# Patient Record
(demographics unavailable — no encounter records)

---

## 2017-06-26 NOTE — ED PDOC
Arrival/HPI





- General


Historian: Patient





- History of Present Illness


Time/Duration: > week


Symptom Course: Intermittent


Context: Home





- General


Chief Complaint: Abdominal Pain


Time Seen by Provider: 06/26/17 17:37





- History of Present Illness


Narrative History of Present Illness (Text): 





06/26/17 18:32


21 yo female with PMH of asthma presented to emergency department with 

abdominal pain. Patient states the pain started 2 weeks ago. She states that 

its a constant pain, located on her lower left quadrant. It is temporarily 

improved with ibuprofen medication. She states the pain is worse when she is 

laying on her left side. She also reports nasal congestion and sore throat for 

which she recently received a prescription for antibiotics. She also report 

increase urinary frequency, denies dysuria, chest pain. 


PMD:   





 (Vijaya Zhou)





Past Medical History





- Provider Review


Nursing Documentation Reviewed: Yes





- Infectious Disease


Hx of Infectious Diseases: None





- Tetanus Immunization


Tetanus Immunization: Unknown





- Cardiac


Hx Cardiac Disorders: No





- Pulmonary


Hx Respiratory Disorders: Yes


Hx Asthma: Yes


Hx Bronchitis: Yes





- Neurological


Hx Neurological Disorder: No





- HEENT


Hx HEENT Disorder: No





- Renal


Hx Renal Disorder: No





- Endocrine/Metabolic


Hx Endocrine Disorders: No





- Hematological/Oncological


Hx Blood Disorders: No





- Integumentary


Hx Dermatological Disorder: No





- Musculoskeletal/Rheumatological


Hx Musculoskeletal Disorders: No





- Gastrointestinal


Hx Gastrointestinal Disorders: No





- Genitourinary/Gynecological


Hx Genitourinary Disorders: Yes


Other/Comment: bladder infection





- Psychiatric


Hx Psychophysiologic Disorder: No


Hx Substance Use: No





- Surgical History


Other/Comment: WISDOM TOOTH EXRACTION





- Anesthesia


Hx Anesthesia: No


Hx Anesthesia Reactions: No


Hx Malignant Hyperthermia: No





- Suicidal Assessment


Feels Threatened In Home Enviroment: No





Family/Social History





- Physician Review


Nursing Documentation Reviewed: Yes


Family/Social History: No Known Family HX


Smoking Status: Current Some Days Smoker


Hx Alcohol Use: Yes


Hx Substance Use: No


Substance used: MARIJUANA





Allergies/Home Meds


Allergies/Adverse Reactions: 


Allergies





shrimp Allergy (Verified 06/26/17 17:54)


 RASH








Home Medications: 


 Home Meds











 Medication  Instructions  Recorded  Confirmed


 


Norethindrone AC-Eth Estradiol 1 tab PO DAILY 06/26/17 06/26/17





[Loestrin 21 1-20 Tablet]   














Review of Systems





- Review of Systems


Constitutional: Normal.  absent: Fatigue


Eyes: Normal.  absent: Vision Changes


ENT: Sore Throat, Rhinorrhea


Respiratory: Normal.  absent: SOB, Cough


Cardiovascular: Normal.  absent: Chest Pain, Syncope


Gastrointestinal: Abdominal Pain, Nausea.  absent: Constipation, Diarrhea, 

Vomiting


Genitourinary Female: Normal, Frequency.  absent: Dysuria


Musculoskeletal: Normal.  absent: Arthralgias, Myalgias


Skin: Normal.  absent: Rash, Pruritis


Neurological: Normal, Headache.  absent: Dizziness


Endocrine: Normal.  absent: Diaphoresis


Hemo/Lymphatic: Normal.  absent: Easy Bleeding, Easy Bruising


Psychiatric: Normal





Physical Exam





- Systems Exam


Head: Present: Atraumatic, Normocephalic


Pupils: Present: PERRL.  No: Non-Reactive, Pinpoint


Extroacular Muscles: Present: EOMI.  No: Entrapment


Conjunctiva: Present: Normal


Mouth: Present: Moist Mucous Membranes


Neck: Present: Normal Range of Motion


Respiratory/Chest: Present: Clear to Auscultation, Good Air Exchange.  No: 

Respiratory Distress, Accessory Muscle Use, Wheezes, Rales, Rhonchi, Tachypneic


Cardiovascular: Present: Regular Rate and Rhythm, Normal S1, S2.  No: Murmurs, 

Tachycardic, Bradycardic


Abdomen: Present: Tenderness (mild ), Normal Bowel Sounds.  No: Distention, 

Peritoneal Signs


Back: Present: Normal Inspection.  No: CVA Tenderness, Midline Tenderness, 

Paraspinal Tenderness


Upper Extremity: Present: Normal Inspection.  No: Cyanosis, Edema, Tenderness, 

Swelling


Lower Extremity: Present: Normal Inspection.  No: Edema, CALF TENDERNESS


Neurological: Present: GCS=15, CN II-XII Intact, Speech Normal


Skin: Present: Warm, Dry, Normal Color.  No: Rashes, Diaphoretic


Psychiatric: Present: Alert, Oriented x 3, Normal Insight, Normal Concentration





Medical Decision Making


ED Course and Treatment: 





06/26/17 18:41


Impression:


21 yo female with PMH of asthma presented to emergency department with 

abdominal pain.


Differential Diagnosis included but are not limited to:  


- UTI, pregnancy 


Plan:


- Urinalysis


- urine pregnancy test 


- Reassess and disposition





Progress Notes:











06/26/17 21:18


- Patient states pain has improved. All lab results were discussed with 

patient. She is to follow up with PMD for elevated LFTs. She was instructed to 

follow up with PMD in 1-2 days. Discharge plan was discussed with patient, she 

is in agreement. All questions answered.  (Vijaya Zhou)


Patient seen and examined with resident.


Came up with treatment and disposition plan with resident.





On reevaluation, patient reports that she feels much better and would like to 

be discharged home. Patient's repeat abdominal exam is soft, nontender, non 

distended with positive bowel sounds in all 4 quadrants and no peritoneal 

signs. Patient is tolerating PO without any difficulty.





Pt states she understands to return to the ER right away for new or worsening 

symptoms or for inability to f/u with PMD or specialist as instructed. 





Patient states that she fully agrees with and understands discharge 

instructions. States that she agrees with the plan and disposition. Verbalized 

and repeated discharge instructions and plan. I have given the patient 

opportunity to ask any additional questions. 


 (Jefferson Leal)





- Lab Interpretations


Lab Results: 








 06/26/17 20:35 





 06/26/17 20:35 





 Lab Results





06/26/17 20:35: Sodium 138, Potassium 4.4, Chloride 104, Carbon Dioxide 25, 

Anion Gap 13, BUN 11, Creatinine 0.8, Est GFR (African Amer) > 60, Est GFR (Non-

Af Amer) > 60, Random Glucose 85, Calcium 9.0, Total Bilirubin 0.7, AST 82 H, 

 H, Alkaline Phosphatase 79, Total Protein 7.3, Albumin 3.9, Globulin 3.5

, Albumin/Globulin Ratio 1.1, Lipase 57


06/26/17 20:35: WBC 9.0, RBC 4.40, Hgb 12.7, Hct 37.5, MCV 85.2, MCH 28.9, MCHC 

33.9, RDW 12.6, Plt Count 209, MPV 10.8, Gran % 30.8 L, Lymph % (Auto) 58.7 H, 

Mono % (Auto) 8.7 H, Eos % (Auto) 0.9 L, Baso % (Auto) 0.9, Gran # 2.76, Lymph 

# 5.3 H, Mono # 0.8 H, Eos # 0.1, Baso # 0.08


06/26/17 19:11: Urine Color Yellow, Urine Appearance Clear, Urine pH 6.0, Ur 

Specific Gravity 1.025, Urine Protein Negative, Urine Glucose (UA) Negative, 

Urine Ketones Negative, Urine Blood Trace-intact H, Urine Nitrate Negative, 

Urine Bilirubin Negative, Urine Urobilinogen 1.0 H, Ur Leukocyte Esterase Trace 

H, Urine RBC 0 - 2, Urine WBC 15 - 20, Ur Epithelial Cells 10 - 12, Urine 

Bacteria Few











- Medication Orders


Current Medication Orders: 











Discontinued Medications





Ketorolac Tromethamine (Toradol)  30 mg IVP STAT STA


   Stop: 06/26/17 19:45


   Last Admin: 06/26/17 20:57  Dose: 30 mg











Disposition/Present on Arrival





- Present on Arrival


Any Indicators Present on Arrival: No


History of DVT/PE: No


History of Uncontrolled Diabetes: No


Urinary Catheter: No


History of Decub. Ulcer: No


History Surgical Site Infection Following: None





- Disposition


Have Diagnosis and Disposition been Completed?: Yes


Disposition Time: 21:15


Patient Plan: Discharge





- Disposition


Diagnosis: 


 UTI (urinary tract infection)





Disposition: HOME/ ROUTINE


Condition: GOOD


Discharge Instructions (ExitCare):  Urinary Tract Infection in Women (ED)


Additional Instructions: 





Alexandria Hernandez, thank you for letting us take care of you today. Your 

provider was Dr. Zhou and Dr. Leal. You were treated for UTI. The 

emergency medical care you received today was directed at your acute symptoms. 

If you were prescribed any medication, please fill it and take as directed. It 

may take several days for your symptoms to resolve. Return to the Emergency 

Department if your symptoms worsen, do not improve, or if you have any other 

problems.





Please contact your doctor or call one of the physicians/clinics you have been 

referred to that are listed on the Patient Visit Information form that is 

included in your discharge packet. Bring any paperwork you were given at 

discharge with you along with any medications you are taking to your follow up 

visit. Our treatment cannot replace ongoing medical care by a primary care 

provider (PCP) outside of the emergency department.





Thank you for allowing the [facility] team to be part of your care today.








If you had a blood, urine, or wound culture: It will take several days for the 

results, if any change in treatment is needed we will contact you.***





Prescriptions: 


Ibuprofen [Motrin] 600 mg PO Q6 PRN #20 tab


 PRN Reason: Pain, Moderate (4-7)


Nitrofurantoin Macrocrystals [Macrobid] 100 mg PO BID #14 cap


Referrals: 


Santos Hdze, [Primary Care Provider] - Follow up with primary


Nelsy Macario MD [Staff Provider] - Follow up with primary

## 2018-01-13 NOTE — ED PDOC
Arrival/HPI





- General


Chief Complaint: Trauma


Time Seen by Provider: 01/13/18 13:11





- History of Present Illness


Narrative History of Present Illness (Text): 





01/13/18 13:12


22 y/o female, pmh including chronic rt. ankle pain, nkda, c/o rt. ankle pain 

and left knee injury s/p fall on the wet step yesterday x 2 days.  Pt. was 

walking down the stair, twisted the rt. ankle and landed on the left knee, no 

numbness or tingling, no calf pain, no palpitation, no rash, no night sweat, no 

dizziness, no chest pain or shortness of breath, no head/neck/back/chest/

abdominal injury, no other medical or psychological complaints. 





Past Medical History





- Provider Review


Nursing Documentation Reviewed: Yes





- Infectious Disease


Hx of Infectious Diseases: None





- Tetanus Immunization


Tetanus Immunization: Unknown





- Cardiac


Hx Cardiac Disorders: No





- Pulmonary


Hx Respiratory Disorders: Yes


Hx Asthma: Yes


Hx Bronchitis: Yes





- Neurological


Hx Neurological Disorder: No





- HEENT


Hx HEENT Disorder: No





- Renal


Hx Renal Disorder: No





- Endocrine/Metabolic


Hx Endocrine Disorders: No





- Hematological/Oncological


Hx Blood Disorders: No





- Integumentary


Hx Dermatological Disorder: No





- Musculoskeletal/Rheumatological


Hx Musculoskeletal Disorders: No





- Gastrointestinal


Hx Gastrointestinal Disorders: No





- Genitourinary/Gynecological


Hx Genitourinary Disorders: Yes


Other/Comment: bladder infection





- Psychiatric


Hx Psychophysiologic Disorder: No


Hx Substance Use: No





- Surgical History


Other/Comment: WISDOM TOOTH EXRACTION





- Anesthesia


Hx Anesthesia: Yes


Hx Anesthesia Reactions: No


Hx Malignant Hyperthermia: No





- Suicidal Assessment


Feels Threatened In Home Enviroment: No





Family/Social History





- Physician Review


Nursing Documentation Reviewed: Yes


Family/Social History: Unknown Family HX


Smoking Status: Current Some Days Smoker


Hx Alcohol Use: Yes


Hx Substance Use: No


Substance used: MARIJUANA





Allergies/Home Meds


Allergies/Adverse Reactions: 


Allergies





shrimp Allergy (Verified 06/26/17 17:54)


 RASH


seafood Allergy (Uncoded 01/13/18 12:29)


 RASH








Home Medications: 


 Home Meds











 Medication  Instructions  Recorded  Confirmed


 


Norethindrone AC-Eth Estradiol 1 tab PO DAILY 06/26/17 01/13/18





[Loestrin 21 1-20 Tablet]   














Review of Systems





- Review of Systems


Constitutional: absent: Fatigue, Fevers


Eyes: absent: Vision Changes


ENT: absent: Hearing Changes


Respiratory: absent: SOB, Cough


Cardiovascular: absent: Chest Pain


Gastrointestinal: absent: Abdominal Pain, Nausea, Vomiting


Musculoskeletal: Arthralgias, Myalgias.  absent: Back Pain, Neck Pain, Joint 

Swelling


Skin: absent: Rash, Pruritis


Neurological: absent: Headache, Dizziness


Psychiatric: absent: Anxiety, Depression, Suicidal Ideation





Physical Exam


Vital Signs Reviewed: Yes


Vital Signs











  Temp Pulse Resp BP Pulse Ox


 


 01/13/18 15:09   76  18  118/78  99


 


 01/13/18 12:32  98.6 F  72  16  121/80  98











Temperature: Afebrile


Blood Pressure: Normal


Pulse: Regular


Respiratory Rate: Normal


Appearance: Positive for: Well-Appearing, Non-Toxic, Comfortable


Pain Distress: Mild


Mental Status: Positive for: Alert and Oriented X 3





- Systems Exam


Head: Present: Atraumatic, Normocephalic


Pupils: Present: PERRL


Extroacular Muscles: Present: EOMI


Conjunctiva: Present: Normal


Mouth: Present: Moist Mucous Membranes


Neck: Present: Normal Range of Motion


Respiratory/Chest: Present: Clear to Auscultation, Good Air Exchange.  No: 

Respiratory Distress, Accessory Muscle Use


Cardiovascular: Present: Regular Rate and Rhythm, Normal S1, S2.  No: Murmurs


Abdomen: Present: Normal Bowel Sounds.  No: Tenderness, Distention, Peritoneal 

Signs


Back: Present: Normal Inspection


Upper Extremity: Present: Normal Inspection.  No: Cyanosis, Edema


Lower Extremity: Present: Normal Inspection, Other (Bilateral lower extremities

: +ttp on the lateral malleolus of the rt. ankle with no swelling, no 

tenderness or swelling of the left knee, negative viola and priest  signs, no 

other bony tenderness or swelling, FROM without limitation, sensation intact, 

motor 5/5, +DPPT pulses, capillary refill< 2 seconds, neurovascular intact. ).  

No: Edema


Neurological: Present: GCS=15, CN II-XII Intact, Speech Normal


Skin: Present: Warm, Dry, Normal Color.  No: Rashes


Psychiatric: Present: Alert, Oriented x 3, Normal Insight, Normal Concentration





Medical Decision Making


ED Course and Treatment: 





01/13/18 13:15


-Xrays


-Tylenol


-Observe and reassess





01/13/18 15:02


-Rt. ankle: no fracture or dislocation


-Lt. knee xray: no fracture or dislocation


-Ace wrap and crutches


-Discharge home with naproxen, ace wrap, crutches, follow up with your own pmd 

and orthopedic within 2 days, return to the ER for any new or worsening signs 

or symptoms. 





- RAD Interpretation


Radiology Orders: 








01/13/18 13:11


ANKLE RIGHT 3 VIEWS ROUTINE [RAD] Stat 


KNEE WITH PATELLA LEFT 3 VIEW [RAD] Stat 











Rt. ankle: 


PROCEDURE:  Right Ankle Radiographs.





HISTORY:


rt. ankle pain s/p inversion injury  





COMPARISON:


None





FINDINGS:





BONES:


Normal. No fracture. 





JOINTS:


Normal. No osteoarthritis. Ankle mortise maintained. Talar dome intact





SOFT TISSUES:


Normal. 





OTHER FINDINGS:


None.





IMPRESSION:


Normal right ankle radiographs.


*-*-----------------------------------------------------------------------------

-------------------------------------------


Lt. knee xray:


PROCEDURE:  Left Knee Radiographs.





HISTORY:


Pain.





COMPARISON:


None.





FINDINGS:





BONES:


Normal. No fracture. 





JOINTS:


Normal. No osteoarthritis. 





JOINT EFFUSION:


None. 





OTHER FINDINGS:


None.





IMPRESSION:


Normal radiographs of the left knee.





: Radiologist





- Medication Orders


Current Medication Orders: 











Discontinued Medications





Acetaminophen (Tylenol 325mg Tab)  325 mg PO STAT STA


   Stop: 01/13/18 13:17


   Last Admin: 01/13/18 13:24  Dose: 325 mg





MAR Pain/Vitals


 Document     01/13/18 13:24  GMD  (Rec: 01/13/18 13:25  [facility]  [facility]


     Pain Reassessment


      Is This A Pain ReAssessment?               No


     Sleep


      Is patient sleeping during reassessment?   No


     Presence of Pain


      Presence of Pain                           Yes














- PA / NP / Resident Statement


MD/DO has reviewed & agrees with the documentation as recorded.





Disposition/Present on Arrival





- Present on Arrival


Any Indicators Present on Arrival: No


History of DVT/PE: No


History of Uncontrolled Diabetes: No


Urinary Catheter: No


History of Decub. Ulcer: No


History Surgical Site Infection Following: None





- Disposition


Have Diagnosis and Disposition been Completed?: Yes


Diagnosis: 


 Knee pain, Ankle pain





Disposition: HOME/ ROUTINE


Disposition Time: 15:03


Patient Plan: Discharge


Condition: GOOD


Additional Instructions: 


-Discharge home with naproxen, ace wrap, crutches, follow up with your own pmd 

and orthopedic within 2 days, return to the ER for any new or worsening signs 

or symptoms. 


Prescriptions: 


Naproxen [Naprosyn] 500 mg PO BID PRN #22 tab


 PRN Reason: Other


Referrals: 


Santos Hdez, [Primary Care Provider] - Follow up with primary


Saqib Mary MD [Staff Provider] - Follow up with primary


Forms:  [employer] (English), WORK NOTE

## 2018-01-13 NOTE — RAD
PROCEDURE:  Right Ankle Radiographs.



HISTORY:

rt. ankle pain s/p inversion injury  



COMPARISON:

None



FINDINGS:



BONES:

Normal. No fracture. 



JOINTS:

Normal. No osteoarthritis. Ankle mortise maintained. Talar dome intact



SOFT TISSUES:

Normal. 



OTHER FINDINGS:

None.



IMPRESSION:

Normal right ankle radiographs.